# Patient Record
Sex: MALE | Race: WHITE | NOT HISPANIC OR LATINO | Employment: FULL TIME | URBAN - METROPOLITAN AREA
[De-identification: names, ages, dates, MRNs, and addresses within clinical notes are randomized per-mention and may not be internally consistent; named-entity substitution may affect disease eponyms.]

---

## 2017-08-28 ENCOUNTER — OFFICE VISIT (OUTPATIENT)
Dept: INTERNAL MEDICINE | Facility: MEDICAL CENTER | Age: 27
End: 2017-08-28
Payer: COMMERCIAL

## 2017-08-28 VITALS
BODY MASS INDEX: 25.87 KG/M2 | HEIGHT: 72 IN | OXYGEN SATURATION: 96 % | DIASTOLIC BLOOD PRESSURE: 75 MMHG | HEART RATE: 80 BPM | TEMPERATURE: 98.7 F | SYSTOLIC BLOOD PRESSURE: 124 MMHG | WEIGHT: 191 LBS

## 2017-08-28 DIAGNOSIS — Z72.51 HISTORY OF UNPROTECTED SEX: ICD-10-CM

## 2017-08-28 DIAGNOSIS — Z23 INFLUENZA VACCINATION ADMINISTERED AT CURRENT VISIT: ICD-10-CM

## 2017-08-28 DIAGNOSIS — Z23 TETANUS-DIPHTHERIA VACCINATION ADMINISTERED AT CURRENT VISIT: ICD-10-CM

## 2017-08-28 DIAGNOSIS — F32.A DEPRESSION, UNSPECIFIED DEPRESSION TYPE: ICD-10-CM

## 2017-08-28 DIAGNOSIS — D22.9 NUMEROUS MOLES: ICD-10-CM

## 2017-08-28 DIAGNOSIS — E04.2 MULTIPLE THYROID NODULES: ICD-10-CM

## 2017-08-28 DIAGNOSIS — E03.9 HYPOTHYROIDISM, UNSPECIFIED TYPE: ICD-10-CM

## 2017-08-28 DIAGNOSIS — Z00.00 PREVENTATIVE HEALTH CARE: ICD-10-CM

## 2017-08-28 PROCEDURE — 90715 TDAP VACCINE 7 YRS/> IM: CPT | Performed by: INTERNAL MEDICINE

## 2017-08-28 PROCEDURE — 90686 IIV4 VACC NO PRSV 0.5 ML IM: CPT | Performed by: INTERNAL MEDICINE

## 2017-08-28 PROCEDURE — 90472 IMMUNIZATION ADMIN EACH ADD: CPT | Performed by: INTERNAL MEDICINE

## 2017-08-28 PROCEDURE — 99204 OFFICE O/P NEW MOD 45 MIN: CPT | Mod: GC,25 | Performed by: INTERNAL MEDICINE

## 2017-08-28 PROCEDURE — 90471 IMMUNIZATION ADMIN: CPT | Performed by: INTERNAL MEDICINE

## 2017-08-28 RX ORDER — BUPROPION HYDROCHLORIDE 75 MG/1
75 TABLET ORAL 2 TIMES DAILY
Qty: 60 TAB | Refills: 2 | Status: SHIPPED | OUTPATIENT
Start: 2017-08-28 | End: 2017-12-20 | Stop reason: SDUPTHER

## 2017-08-28 RX ORDER — LEVOTHYROXINE SODIUM 175 UG/1
175 TABLET ORAL
Qty: 30 TAB | Refills: 2 | Status: SHIPPED | OUTPATIENT
Start: 2017-08-28 | End: 2017-12-20 | Stop reason: SDUPTHER

## 2017-08-28 ASSESSMENT — PATIENT HEALTH QUESTIONNAIRE - PHQ9
SUM OF ALL RESPONSES TO PHQ QUESTIONS 1-9: 23
5. POOR APPETITE OR OVEREATING: 3 - NEARLY EVERY DAY
CLINICAL INTERPRETATION OF PHQ2 SCORE: 5

## 2017-08-28 NOTE — PROGRESS NOTES
New Patient to Establish    Reason to establish: New patient to establish    CC: establish care    HPI: Mr. Taylor is a 25 y/o male with PMHx of hypothyroidism and depression who presents today to establish a PCP.   Per chart review, patient has had issues with insurance coverage and that has made him intermittently compliant with thyroid meds and imaging studies, and whenever he does not take them for a while he feels tired.  Thyroid US in 2006 showed multiple small b/l nodules (<2mm) but repeat US in 2009 showed no nodularity and was otherwise normal.      Today, patient reports that he has not been taking his thyroid meds x over a year.  He reports overall fatigue but no focal weakness.  He occasionally has palpitations but attributes that to anxiety.  He does suffer from depression and has been off wellbutrin for a while as well but felt it really helped when he was on it.  He is also open to therapy.      He does report that he works in a boarding pass factory and occasionally gets a sore throat from inhaling silicon in the air despite use of a mask.  He also works with metal a lot and has not had a recent tetanus shot.  He has not had his flu shot yet this year.  He also reports that he has unprotected sex and would like to be tested for STDs.  He does have tattoos, but states these were all done in clean places with fresh needles.  He has no h/o IVDU.   Patient also wishes to see a dermatologist to have some moles on his back evaluated.  He quit smoking a few months ago and exercises regularly.    Patient Active Problem List    Diagnosis Date Noted   • Preventative health care 08/28/2017   • History of unprotected sex 08/28/2017   • Numerous moles 08/28/2017   • Depression 09/09/2015   • Hypothyroid 06/01/2009   • Multiple thyroid nodules 06/01/2009       Past Medical History:   Diagnosis Date   • Depression 9/9/2015   • Hypothyroid 6/1/2009       Current Outpatient Prescriptions   Medication Sig  Dispense Refill   • levothyroxine (SYNTHROID) 175 MCG Tab Take 1 Tab by mouth Every morning on an empty stomach. 30 Tab 2   • buPROPion (WELLBUTRIN) 75 MG Tab Take 1 Tab by mouth 2 times a day. 60 Tab 2     No current facility-administered medications for this visit.        Allergies as of 08/28/2017 - Reviewed 08/28/2017   Allergen Reaction Noted   • Sulfa drugs  06/01/2009       Social History     Social History   • Marital status: Single     Spouse name: N/A   • Number of children: N/A   • Years of education: N/A     Occupational History   • Not on file.     Social History Main Topics   • Smoking status: Former Smoker     Packs/day: 0.50     Years: 7.00     Types: Cigarettes     Quit date: 4/11/2017   • Smokeless tobacco: Never Used   • Alcohol use 2.4 oz/week     4 Cans of beer per week   • Drug use:      Frequency: 5.0 times per week     Types: Marijuana   • Sexual activity: Yes     Partners: Female     Birth control/ protection: Other-See Comments      Comment: occasional condom use     Other Topics Concern   • Not on file     Social History Narrative   • No narrative on file       Family History   Problem Relation Age of Onset   • Adopted: Yes   • Psychiatry Mother        Past Surgical History:   Procedure Laterality Date   • HERNIA REPAIR      as an infant       ROS: As per HPI. Additional pertinent symptoms as noted below.  Constitutional: no fevers, chills, weight change; + fatigue  Eyes: no blurred vision, discharge, eye pain  ENT: no rhinorrhea, + occasional sore throat, no neck masses  Cardiovascular: no angina, palpitations, PND, orthopnea, edema  Respiratory: no cough, sputum, or dyspnea  GI: no nausea, vomiting, abdominal pain, constipation, or diarrhea  : no dysuria, hematuria, frequency   Musculo-skeletal: no joint or muscle pain  Skin: no rashes or open wounds  Neurological: no headaches, dizziness, motor/sensory loss  Psychological: + anxiety and depression; denies SI/HI  All others  negative    /75   Pulse 80   Temp 37.1 °C (98.7 °F)   Ht 1.829 m (6')   Wt 86.6 kg (191 lb)   SpO2 96%   BMI 25.90 kg/m²     Physical Exam  GEN: patient is in no acute distress   HEAD: normocephalic, atraumatic  EYES: PERRL, EOMI, no exophthalmos  THROAT: moist oral mucosa, no pharyngeal exudates or erythema; + enlarged tonsils with minimal erythema  NECK: supple, no lymphadenopathy, no thyroid enlargement   CVS: regular rate and rhythm, no murmur/rubs/gallops  LUNGS: CTABL, no rales/ronchi   ABD: soft, nontender, no guarding/rebound/rigidity, no organomegaly, BS(+)   NEURO: A&Ox4, no focal neurological deficits   EXT: no pedal edema, clubbing, or cyanosis   SKIN: skin intact, no suspicious rashes or lesions   MSK: no paravertebral tenderness, full ROM, no tremors   PSYCH: mood and affect normal       Assessment and Plan    Hypothyroidism, unspecified type  - pt intermittently compliant with synthroid; last TSH abnormal in 2015  - US in 2006 showed multiple b/l 2-3mm nodules which were not noted on repeat US in 2009  - will repeat TSH and FT4 today and resume synthroid at prior dose of 175 mcg/day  - repeat TSH/FT4 to be done prior to f/u in 2 months    Depression, unspecified depression type  - pt has been on wellbutrin in the past with good results  - denies SI/HI  - will check basic labs (CBC, CMP)  - resume wellbutrin 75 mg BID (start with QD x 1 week then increase dose  - referral placed to psychology    Unprotected Sex  - patient is requesting testing for STDs  - CG/chlam, HIV, RPR, and hep panel ordered    Numerous moles  - no overtly suspicious lesions, but pt referred to dermatology for further evaluation    Preventative health care  - annual flu shot given today (8/2017)  - pt has had chickenpox in childhood, not immunized with varicella  - last tetanus (Td) in 2004; booster (Tdap) given today (8/2017)  - up to date on all childhood vaccines (hep A/B, HiB, MMR, OPV, DTP)  - HIV and GC/chlam  ordered      Follow Up: Return in about 10 weeks (around 11/6/2017) for Long.    Signed by: Mariah Jones M.D.

## 2017-08-28 NOTE — PATIENT INSTRUCTIONS
- have lab work done now and a repeat TSH a few days prior to next visit  - start synthroid every morning on an empty stomach  - start wellbutrin once a day for a week and then go up to twice a day  - make appt with Dermatology and Psychology (call [phone] if you dont hear from the scheduling department within a week)  - return for f/u in 2 months

## 2017-11-06 ENCOUNTER — OFFICE VISIT (OUTPATIENT)
Dept: INTERNAL MEDICINE | Facility: MEDICAL CENTER | Age: 27
End: 2017-11-06
Payer: COMMERCIAL

## 2017-11-06 VITALS
OXYGEN SATURATION: 98 % | SYSTOLIC BLOOD PRESSURE: 122 MMHG | TEMPERATURE: 98.6 F | HEIGHT: 72 IN | RESPIRATION RATE: 14 BRPM | BODY MASS INDEX: 26.36 KG/M2 | DIASTOLIC BLOOD PRESSURE: 76 MMHG | WEIGHT: 194.6 LBS | HEART RATE: 70 BPM

## 2017-11-06 DIAGNOSIS — F32.A DEPRESSION, UNSPECIFIED DEPRESSION TYPE: ICD-10-CM

## 2017-11-06 DIAGNOSIS — E03.9 HYPOTHYROIDISM, UNSPECIFIED TYPE: ICD-10-CM

## 2017-11-06 PROCEDURE — 99213 OFFICE O/P EST LOW 20 MIN: CPT | Mod: GE | Performed by: INTERNAL MEDICINE

## 2017-11-06 ASSESSMENT — PATIENT HEALTH QUESTIONNAIRE - PHQ9
5. POOR APPETITE OR OVEREATING: 0 - NOT AT ALL
CLINICAL INTERPRETATION OF PHQ2 SCORE: 2
SUM OF ALL RESPONSES TO PHQ QUESTIONS 1-9: 11

## 2017-11-06 ASSESSMENT — PAIN SCALES - GENERAL: PAINLEVEL: NO PAIN

## 2017-11-06 NOTE — PROGRESS NOTES
Established Patient    Bryn presents today with the following:    CC: depression, hypothyroidism    HPI:   Mr. Taylor is a 25 y/o male with PMHx of hypothyroidism and depression who presents today for f/u.       He reports feeling better after starting synthroid, but does not feel wellbutrin has had much effect.  He attributes this to a bad workplace environment however, and is in the process of switching jobs.  He had his labs done today, so will review results with him once those are resulted.  He denies SI/HI.  We had an extensive discussion of the risks/benefits of medical marijuana to help treat his depression, and patient agrees not to pursue this as a treatment at this time.  He has not yet been able to establish with a therapist or a dermatologist as referred at last visit due to time constraints.      Patient Active Problem List    Diagnosis Date Noted   • Preventative health care 08/28/2017   • History of unprotected sex 08/28/2017   • Numerous moles 08/28/2017   • Depression 09/09/2015   • Hypothyroid 06/01/2009       Current Outpatient Prescriptions   Medication Sig Dispense Refill   • levothyroxine (SYNTHROID) 175 MCG Tab Take 1 Tab by mouth Every morning on an empty stomach. 30 Tab 2   • buPROPion (WELLBUTRIN) 75 MG Tab Take 1 Tab by mouth 2 times a day. 60 Tab 2     No current facility-administered medications for this visit.        ROS: As per HPI. Additional pertinent symptoms as noted below.  Constitutional: no fevers, chills, weight change  Eyes: no blurred vision, discharge, eye pain  ENT: no rhinorrhea, sore throat, neck masses  Cardiovascular: no angina, palpitations, PND, orthopnea, edema  Respiratory: no cough, sputum, or dyspnea  GI: no nausea, vomiting, abdominal pain, constipation, or diarrhea  : no dysuria, hematuria, frequency   Musculo-skeletal: no joint or muscle pain  Skin: no rashes or open wounds  Neurological: no headaches, dizziness, motor/sensory loss   Psychological: no  anxiety; + depression; denies SI/HI; + insomnia  All others negative    /76   Pulse 70   Temp 37 °C (98.6 °F)   Resp 14   Ht 1.829 m (6')   Wt 88.3 kg (194 lb 9.6 oz)   SpO2 98%   BMI 26.39 kg/m²     Physical Exam   GEN: patient is in no acute distress   HEAD: normocephalic, atraumatic  EYES: PERRL, EOMI  THROAT: moist oral mucosa, no pharyngeal exudates or erythema  NECK: supple, no lymphadenopathy, no thyroid enlargement   CVS: regular rate and rhythm, no murmur/rubs/gallops  LUNGS: CTABL, no rales/ronchi   ABD: soft, nontender, no guarding/rebound/rigidity, no organomegaly, BS(+)   NEURO: A&Ox4, no focal neurological deficits   EXT: no pedal edema, clubbing, or cyanosis   SKIN: skin intact, no suspicious rashes or lesions   MSK: no paravertebral tenderness, full ROM   PSYCH: mood and affect normal       Assessment and Plan    Hypothyroidism, unspecified type  - last TSH abnormal in 2015  - US in 2006 showed multiple b/l 2-3mm nodules which were not noted on repeat US in 2009  - continue synthroid at prior dose of 175 mcg/day  - repeat TSH/FT4 pending    Depression, unspecified depression type  - denies SI/HI  - results of labs (CBC/CMP) pending  - continue wellbutrin 75 mg BID   - patient pursuing lifestyle modifications to help with insomnia/depression  - avoid use of THC as able  - psychologist info given to patient to set up appt    Unprotected Sex  - CG/chlam, HIV, RPR, and hep panel results pending from last visit    Numerous moles  - no overtly suspicious lesions  - pt given info to schedule derm appt    Preventative health care  - flu shot given 8/2017  - pt has had chickenpox in childhood, not immunized with varicella  - last tetanus (Td) in 2004; booster (Tdap) given today (8/2017)  - up to date on all childhood vaccines (hep A/B, HiB, MMR, OPV, DTP)  - HIV and GC/chlam results pending      Follow Up: Return in about 3 months (around 2/6/2018).    Signed by: Mariah Jones M.D.

## 2017-11-06 NOTE — PATIENT INSTRUCTIONS
- make appt with derm  [facility]  [address]  [place], [place]  [id number] [phone]  - make psych appt  [facility]   [address], [place] [id number] [phone]  - f/u in 3 months

## 2017-11-08 LAB
ALBUMIN SERPL-MCNC: 4.6 G/DL (ref 3.5–5.5)
ALBUMIN/GLOB SERPL: 1.5 {RATIO} (ref 1.2–2.2)
ALP SERPL-CCNC: 68 IU/L (ref 39–117)
ALT SERPL-CCNC: 28 IU/L (ref 0–44)
AST SERPL-CCNC: 23 IU/L (ref 0–40)
BASOPHILS # BLD AUTO: 0 X10E3/UL (ref 0–0.2)
BASOPHILS NFR BLD AUTO: 1 %
BILIRUB SERPL-MCNC: 0.4 MG/DL (ref 0–1.2)
BUN SERPL-MCNC: 10 MG/DL (ref 6–20)
BUN/CREAT SERPL: 10 (ref 9–20)
C TRACH RRNA SPEC QL NAA+PROBE: NEGATIVE
CALCIUM SERPL-MCNC: 9.7 MG/DL (ref 8.7–10.2)
CHLORIDE SERPL-SCNC: 102 MMOL/L (ref 96–106)
CO2 SERPL-SCNC: 24 MMOL/L (ref 18–29)
CREAT SERPL-MCNC: 0.99 MG/DL (ref 0.76–1.27)
EOSINOPHIL # BLD AUTO: 0.1 X10E3/UL (ref 0–0.4)
EOSINOPHIL NFR BLD AUTO: 3 %
ERYTHROCYTE [DISTWIDTH] IN BLOOD BY AUTOMATED COUNT: 12.9 % (ref 12.3–15.4)
GFR SERPLBLD CREATININE-BSD FMLA CKD-EPI: 104 ML/MIN/1.73
GFR SERPLBLD CREATININE-BSD FMLA CKD-EPI: 120 ML/MIN/1.73
GLOBULIN SER CALC-MCNC: 3.1 G/DL (ref 1.5–4.5)
GLUCOSE SERPL-MCNC: 93 MG/DL (ref 65–99)
HAV IGM SERPL QL IA: NEGATIVE
HBV CORE IGM SERPL QL IA: NEGATIVE
HBV SURFACE AG SERPL QL IA: NEGATIVE
HCT VFR BLD AUTO: 46.2 % (ref 37.5–51)
HCV AB S/CO SERPL IA: 0.1 S/CO RATIO (ref 0–0.9)
HGB BLD-MCNC: 16.5 G/DL (ref 12.6–17.7)
HIV 1+2 AB+HIV1 P24 AG SERPL QL IA: NON REACTIVE
IMM GRANULOCYTES # BLD: 0 X10E3/UL (ref 0–0.1)
IMM GRANULOCYTES NFR BLD: 0 %
IMMATURE CELLS  115398: NORMAL
LYMPHOCYTES # BLD AUTO: 1.7 X10E3/UL (ref 0.7–3.1)
LYMPHOCYTES NFR BLD AUTO: 32 %
MCH RBC QN AUTO: 30.9 PG (ref 26.6–33)
MCHC RBC AUTO-ENTMCNC: 35.7 G/DL (ref 31.5–35.7)
MCV RBC AUTO: 87 FL (ref 79–97)
MONOCYTES # BLD AUTO: 0.4 X10E3/UL (ref 0.1–0.9)
MONOCYTES NFR BLD AUTO: 7 %
MORPHOLOGY BLD-IMP: NORMAL
N GONORRHOEA RRNA SPEC QL NAA+PROBE: NEGATIVE
NEUTROPHILS # BLD AUTO: 2.9 X10E3/UL (ref 1.4–7)
NEUTROPHILS NFR BLD AUTO: 57 %
NRBC BLD AUTO-RTO: NORMAL %
PLATELET # BLD AUTO: 336 X10E3/UL (ref 150–379)
POTASSIUM SERPL-SCNC: 4.2 MMOL/L (ref 3.5–5.2)
PROT SERPL-MCNC: 7.7 G/DL (ref 6–8.5)
RBC # BLD AUTO: 5.34 X10E6/UL (ref 4.14–5.8)
RPR SER QL: NON REACTIVE
SODIUM SERPL-SCNC: 142 MMOL/L (ref 134–144)
T4 FREE SERPL-MCNC: 1.17 NG/DL (ref 0.82–1.77)
TSH SERPL DL<=0.005 MIU/L-ACNC: 9.09 UIU/ML (ref 0.45–4.5)
WBC # BLD AUTO: 5.1 X10E3/UL (ref 3.4–10.8)

## 2018-02-06 ENCOUNTER — OFFICE VISIT (OUTPATIENT)
Dept: URGENT CARE | Facility: CLINIC | Age: 28
End: 2018-02-06
Payer: COMMERCIAL

## 2018-02-06 VITALS
HEIGHT: 72 IN | OXYGEN SATURATION: 97 % | DIASTOLIC BLOOD PRESSURE: 76 MMHG | TEMPERATURE: 98.5 F | HEART RATE: 74 BPM | RESPIRATION RATE: 14 BRPM | BODY MASS INDEX: 25.73 KG/M2 | SYSTOLIC BLOOD PRESSURE: 124 MMHG | WEIGHT: 190 LBS

## 2018-02-06 DIAGNOSIS — J02.9 SORE THROAT: ICD-10-CM

## 2018-02-06 DIAGNOSIS — J02.9 EXUDATIVE PHARYNGITIS: Primary | ICD-10-CM

## 2018-02-06 LAB
INT CON NEG: NEGATIVE
INT CON POS: POSITIVE
S PYO AG THROAT QL: NORMAL

## 2018-02-06 PROCEDURE — 99203 OFFICE O/P NEW LOW 30 MIN: CPT | Performed by: PHYSICIAN ASSISTANT

## 2018-02-06 PROCEDURE — 87880 STREP A ASSAY W/OPTIC: CPT | Performed by: PHYSICIAN ASSISTANT

## 2018-02-06 RX ORDER — AMOXICILLIN AND CLAVULANATE POTASSIUM 875; 125 MG/1; MG/1
1 TABLET, FILM COATED ORAL 2 TIMES DAILY
Qty: 20 TAB | Refills: 0 | Status: SHIPPED | OUTPATIENT
Start: 2018-02-06 | End: 2018-02-16

## 2018-02-06 NOTE — LETTER
February 6, 2018       Patient: Bryn Taylor   YOB: 1990   Date of Visit: 2/6/2018         To Whom It May Concern:    It is my medical opinion that Bryn Taylor may be excused from work for the dates of 2/6/18-2/8/18.      If you have any questions or concerns, please don't hesitate to call [phone]          Sincerely,          Obi Shah P.A.-C.  Electronically Signed

## 2018-02-06 NOTE — PATIENT INSTRUCTIONS
"Strep Throat  Strep throat is an infection of the throat caused by a bacteria named Streptococcus pyogenes. Your health care provider may call the infection streptococcal \"tonsillitis\" or \"pharyngitis\" depending on whether there are signs of inflammation in the tonsils or back of the throat. Strep throat is most common in children aged 5-15 years during the cold months of the year, but it can occur in people of any age during any season. This infection is spread from person to person (contagious) through coughing, sneezing, or other close contact.  SIGNS AND SYMPTOMS   · Fever or chills.  · Painful, swollen, red tonsils or throat.  · Pain or difficulty when swallowing.  · White or yellow spots on the tonsils or throat.  · Swollen, tender lymph nodes or \"glands\" of the neck or under the jaw.  · Red rash all over the body (rare).  DIAGNOSIS   Many different infections can cause the same symptoms. A test must be done to confirm the diagnosis so the right treatment can be given. A \"rapid strep test\" can help your health care provider make the diagnosis in a few minutes. If this test is not available, a light swab of the infected area can be used for a throat culture test. If a throat culture test is done, results are usually available in a day or two.  TREATMENT   Strep throat is treated with antibiotic medicine.  HOME CARE INSTRUCTIONS   · Gargle with 1 tsp of salt in 1 cup of warm water, 3-4 times per day or as needed for comfort.  · Family members who also have a sore throat or fever should be tested for strep throat and treated with antibiotics if they have the strep infection.  · Make sure everyone in your household washes their hands well.  · Do not share food, drinking cups, or personal items that could cause the infection to spread to others.  · You may need to eat a soft food diet until your sore throat gets better.  · Drink enough water and fluids to keep your urine clear or pale yellow. This will help prevent " dehydration.  · Get plenty of rest.  · Stay home from school, day care, or work until you have been on antibiotics for 24 hours.  · Take medicines only as directed by your health care provider.  · Take your antibiotic medicine as directed by your health care provider. Finish it even if you start to feel better.  SEEK MEDICAL CARE IF:   · The glands in your neck continue to enlarge.  · You develop a rash, cough, or earache.  · You cough up green, yellow-brown, or bloody sputum.  · You have pain or discomfort not controlled by medicines.  · Your problems seem to be getting worse rather than better.  · You have a fever.  SEEK IMMEDIATE MEDICAL CARE IF:   · You develop any new symptoms such as vomiting, severe headache, stiff or painful neck, chest pain, shortness of breath, or trouble swallowing.  · You develop severe throat pain, drooling, or changes in your voice.  · You develop swelling of the neck, or the skin on the neck becomes red and tender.  · You develop signs of dehydration, such as fatigue, dry mouth, and decreased urination.  · You become increasingly sleepy, or you cannot wake up completely.  MAKE SURE YOU:  · Understand these instructions.  · Will watch your condition.  · Will get help right away if you are not doing well or get worse.     This information is not intended to replace advice given to you by your health care provider. Make sure you discuss any questions you have with your health care provider.     Document Released: 12/15/2001 Document Revised: 01/08/2016 Document Reviewed: 04/11/2016  [employer] Interactive Patient Education ©2016 [employer]

## 2018-02-07 NOTE — PROGRESS NOTES
Subjective:      Pt is a 27 y.o. male who presents with URI (uri , sore throat , some diarrhea x 2 weeks . needs work note .)            HPI  PT presents to  clinic today complaining of sore throat,  pressure in ears, cough, fatigue, runny nose. PT denies CP, SOB, NV, abdominal pain, joint pain. PT states these symptoms began around 2 weeks ago. PT states the pain is a 6/10 with swallowing, aching in nature and worse at night.  Pt has not taken any RX medications for this condition. The pt's medication list, problem list, and allergies have been evaluated and reviewed during today's visit.      PMH:  Past Medical History:   Diagnosis Date   • Depression 9/9/2015   • Hypothyroid 6/1/2009       PSH:  Past Surgical History:   Procedure Laterality Date   • HERNIA REPAIR      as an infant       Fam Hx:    family history includes Psychiatry in his mother. He was adopted.  Family Status   Relation Status   • Mother        Soc HX:  Social History     Social History   • Marital status: Single     Spouse name: N/A   • Number of children: N/A   • Years of education: N/A     Occupational History   • Not on file.     Social History Main Topics   • Smoking status: Former Smoker     Packs/day: 0.50     Years: 7.00     Types: Cigarettes     Quit date: 4/11/2017   • Smokeless tobacco: Former User     Types: Chew   • Alcohol use 2.4 oz/week     4 Cans of beer per week   • Drug use: Yes     Frequency: 5.0 times per week     Types: Marijuana   • Sexual activity: Yes     Partners: Female     Birth control/ protection: Other-See Comments      Comment: occasional condom use     Other Topics Concern   • Not on file     Social History Narrative   • No narrative on file         Medications:    Current Outpatient Prescriptions:   •  Chlorphen-Pseudoephed-APAP (THERAFLU FLU/COLD PO), Take  by mouth., Disp: , Rfl:   •  amoxicillin-clavulanate (AUGMENTIN) 875-125 MG Tab, Take 1 Tab by mouth 2 times a day for 10 days., Disp: 20 Tab, Rfl: 0  •   Alum & Mag Hydroxide-Simeth (MAALOX PLUS-BENADRYL-XYLOCAINE), Take 15 mL by mouth every 6 hours as needed for up to 5 days., Disp: 300 mL, Rfl: 0  •  levothyroxine (SYNTHROID) 175 MCG Tab, TAKE 1 TABLET BY MOUTH EVERY MORNING ON EMPTY STOMACH, Disp: 90 Tab, Rfl: 3  •  buPROPion (WELLBUTRIN) 75 MG Tab, TAKE 1 TABLET BY MOUTH TWICE A DAY, Disp: 180 Tab, Rfl: 3      Allergies:  Sulfa drugs    ROS  Constitutional: Positive for malaise/fatigue.   HENT: Positive for congestion and sore throat. Negative for ear pain.    Eyes: Negative for blurred vision, double vision and photophobia.   Respiratory: Positive for cough and sputum production. Negative for hemoptysis, shortness of breath and wheezing.    Cardiovascular: Negative for chest pain and palpitations.   Gastrointestinal: Negative for nausea, vomiting, abdominal pain, and constipation. +loose stools  Genitourinary: Negative for dysuria and flank pain.   Musculoskeletal: Negative for falls and myalgias.   Skin: Negative for itching and rash.   Neurological:  Negative for dizziness and tingling.   Endo/Heme/Allergies: Does not bruise/bleed easily.   Psychiatric/Behavioral: Negative for depression. The patient is not nervous/anxious.             Objective:     /76   Pulse 74   Temp 36.9 °C (98.5 °F)   Resp 14   Ht 1.829 m (6')   Wt 86.2 kg (190 lb)   SpO2 97%   BMI 25.77 kg/m²      Physical Exam       Constitutional: PT is oriented to person, place, and time. PT appears well-developed and well-nourished. No distress.   HENT:   Head: Normocephalic and atraumatic.   Right Ear: Hearing, tympanic membrane, external ear and ear canal normal.   Left Ear: Hearing, tympanic membrane, external ear and ear canal normal.   Nose: Mucosal edema, rhinorrhea and sinus tenderness present. Right sinus exhibits frontal sinus tenderness. Left sinus exhibits frontal sinus tenderness.   Mouth/Throat: Uvula is midline. Mucous membranes are pale. Posterior oropharyngeal edema and  posterior oropharyngeal erythema with exudate noted on exam.   Eyes: Conjunctivae normal and EOM are normal. Pupils are equal, round, and reactive to light.   Neck: Normal range of motion. Neck supple. No thyromegaly present.   Cardiovascular: Normal rate, regular rhythm, normal heart sounds and intact distal pulses.  Exam reveals no gallop and no friction rub.    No murmur heard.  Pulmonary/Chest: Effort normal and breath sounds normal. No respiratory distress. PT has no wheezes. PT has no rales. PT exhibits no tenderness.   Abdominal: Soft. Bowel sounds are normal. PT exhibits no distension and no mass. There is no tenderness. There is no rebound and no guarding.   Musculoskeletal: Normal range of motion. PT exhibits no edema and no tenderness.   Lymphadenopathy:     PT has no cervical adenopathy.   Neurological: PT is alert and oriented to person, place, and time. PT displays normal reflexes. No cranial nerve deficit. PT exhibits normal muscle tone. Coordination normal.   Skin: Skin is warm and dry. No rash noted. No erythema.   Psychiatric: PT has a normal mood and affect. PT behavior is normal. Judgment and thought content normal.        Assessment/Plan:     1. Exudative pharyngitis  Back-up abx if symptoms do not improve in 2-3 days. PT on clinical presentation has exudate on oropharynx and it's possible beyond the strep A testing that this could be a different type of strep (C/G) or A. haemolyticum     - POCT Rapid Strep A-->NEG  - amoxicillin-clavulanate (AUGMENTIN) 875-125 MG Tab; Take 1 Tab by mouth 2 times a day for 10 days.  Dispense: 20 Tab; Refill: 0  - Alum & Mag Hydroxide-Simeth (MAALOX PLUS-BENADRYL-XYLOCAINE); Take 15 mL by mouth every 6 hours as needed for up to 5 days.  Dispense: 300 mL; Refill: 0    2. Sore throat    Rest, fluids encouraged.  OTC decongestant for congestion  Note given for work.  AVS with medical info given.  Pt was in full understanding and agreement with the plan.  Follow-up  as needed if symptoms worsen or fail to improve.

## 2018-03-02 ENCOUNTER — OFFICE VISIT (OUTPATIENT)
Dept: URGENT CARE | Facility: CLINIC | Age: 28
End: 2018-03-02
Payer: COMMERCIAL

## 2018-03-02 VITALS
RESPIRATION RATE: 14 BRPM | WEIGHT: 189 LBS | BODY MASS INDEX: 25.6 KG/M2 | HEIGHT: 72 IN | TEMPERATURE: 98.5 F | SYSTOLIC BLOOD PRESSURE: 120 MMHG | OXYGEN SATURATION: 98 % | HEART RATE: 76 BPM | DIASTOLIC BLOOD PRESSURE: 80 MMHG

## 2018-03-02 DIAGNOSIS — J02.9 EXUDATIVE PHARYNGITIS: Primary | ICD-10-CM

## 2018-03-02 LAB
INT CON NEG: NEGATIVE
INT CON POS: POSITIVE
S PYO AG THROAT QL: NORMAL

## 2018-03-02 PROCEDURE — 99214 OFFICE O/P EST MOD 30 MIN: CPT | Performed by: PHYSICIAN ASSISTANT

## 2018-03-02 PROCEDURE — 87880 STREP A ASSAY W/OPTIC: CPT | Performed by: PHYSICIAN ASSISTANT

## 2018-03-02 RX ORDER — CLINDAMYCIN HYDROCHLORIDE 300 MG/1
300 CAPSULE ORAL 3 TIMES DAILY
Qty: 30 CAP | Refills: 0 | Status: SHIPPED | OUTPATIENT
Start: 2018-03-02 | End: 2019-05-30

## 2018-03-02 NOTE — LETTER
March 2, 2018         Patient: Bryn Taylor   YOB: 1990   Date of Visit: 3/2/2018           To Whom it May Concern:    Bryn Taylor was seen in my clinic on 3/2/2018. He may be excused from work for the dates of 3/5/18-3/6/18.        If you have any questions or concerns, please don't hesitate to call.        Sincerely,           Obi Shah P.A.-C.  Electronically Signed

## 2018-03-03 NOTE — PATIENT INSTRUCTIONS
Strep Throat  Strep throat is an infection of the throat. It is caused by germs. Strep throat spreads from person to person because of coughing, sneezing, or close contact.  Follow these instructions at home:  Medicines  · Take over-the-counter and prescription medicines only as told by your doctor.  · Take your antibiotic medicine as told by your doctor. Do not stop taking the medicine even if you feel better.  · Have family members who also have a sore throat or fever go to a doctor.  Eating and drinking  · Do not share food, drinking cups, or personal items.  · Try eating soft foods until your sore throat feels better.  · Drink enough fluid to keep your pee (urine) clear or pale yellow.  General instructions  · Rinse your mouth (gargle) with a salt-water mixture 3-4 times per day or as needed. To make a salt-water mixture, stir ½-1 tsp of salt into 1 cup of warm water.  · Make sure that all people in your house wash their hands well.  · Rest.  · Stay home from school or work until you have been taking antibiotics for 24 hours.  · Keep all follow-up visits as told by your doctor. This is important.  Contact a doctor if:  · Your neck keeps getting bigger.  · You get a rash, cough, or earache.  · You cough up thick liquid that is green, yellow-brown, or bloody.  · You have pain that does not get better with medicine.  · Your problems get worse instead of getting better.  · You have a fever.  Get help right away if:  · You throw up (vomit).  · You get a very bad headache.  · You neck hurts or it feels stiff.  · You have chest pain or you are short of breath.  · You have drooling, very bad throat pain, or changes in your voice.  · Your neck is swollen or the skin gets red and tender.  · Your mouth is dry or you are peeing less than normal.  · You keep feeling more tired or it is hard to wake up.  · Your joints are red or they hurt.  This information is not intended to replace advice given to you by your health care  provider. Make sure you discuss any questions you have with your health care provider.  Document Released: 06/05/2009 Document Revised: 08/16/2017 Document Reviewed: 04/11/2016  [employer] Interactive Patient Education © 2017 [employer]

## 2018-03-06 NOTE — PROGRESS NOTES
Subjective:      Pt is a 27 y.o. male who presents with Pharyngitis (sore throat , nausea x 4 days . needs work note .)            HPI  PT presents to  clinic today complaining of sore throat, mild nausea,  fatigue, runny nose. PT denies CP, SOB, NVD, abdominal pain, joint pain. PT states these symptoms began around 4 days ago. PT states the pain is a 7/10 with swallowing, aching in nature and worse at night.  Pt has not taken any RX medications for this condition. The pt's medication list, problem list, and allergies have been evaluated and reviewed during today's visit.      PMH:  Past Medical History:   Diagnosis Date   • Depression 9/9/2015   • Hypothyroid 6/1/2009       PSH:  Past Surgical History:   Procedure Laterality Date   • HERNIA REPAIR      as an infant       Fam Hx:    family history includes Psychiatry in his mother. He was adopted.  Family Status   Relation Status   • Mother        Soc HX:  Social History     Social History   • Marital status: Single     Spouse name: N/A   • Number of children: N/A   • Years of education: N/A     Occupational History   • Not on file.     Social History Main Topics   • Smoking status: Former Smoker     Packs/day: 0.50     Years: 7.00     Types: Cigarettes     Quit date: 4/11/2017   • Smokeless tobacco: Former User     Types: Chew   • Alcohol use 2.4 oz/week     4 Cans of beer per week   • Drug use: Yes     Frequency: 5.0 times per week     Types: Marijuana   • Sexual activity: Yes     Partners: Female     Birth control/ protection: Other-See Comments      Comment: occasional condom use     Other Topics Concern   • Not on file     Social History Narrative   • No narrative on file         Medications:    Current Outpatient Prescriptions:   •  clindamycin (CLEOCIN) 300 MG Cap, Take 1 Cap by mouth 3 times a day., Disp: 30 Cap, Rfl: 0  •  Chlorphen-Pseudoephed-APAP (THERAFLU FLU/COLD PO), Take  by mouth., Disp: , Rfl:   •  levothyroxine (SYNTHROID) 175 MCG Tab, TAKE 1  TABLET BY MOUTH EVERY MORNING ON EMPTY STOMACH, Disp: 90 Tab, Rfl: 3  •  buPROPion (WELLBUTRIN) 75 MG Tab, TAKE 1 TABLET BY MOUTH TWICE A DAY, Disp: 180 Tab, Rfl: 3      Allergies:  Other food and Sulfa drugs    ROS    Constitutional: Positive for malaise/fatigue.   HENT: Positive for congestion and sore throat. Negative for ear pain.    Eyes: Negative for blurred vision, double vision and photophobia.   Respiratory:  Negative for hemoptysis, shortness of breath and wheezing.    Cardiovascular: Negative for chest pain and palpitations.   Gastrointestinal: Negative for nausea, vomiting, abdominal pain, diarrhea and constipation.   Genitourinary: Negative for dysuria and flank pain.   Musculoskeletal: Negative for falls and myalgias.   Skin: Negative for itching and rash.   Neurological:  Negative for dizziness and tingling.   Endo/Heme/Allergies: Does not bruise/bleed easily.   Psychiatric/Behavioral: Negative for depression. The patient is not nervous/anxious.           Objective:     /80   Pulse 76   Temp 36.9 °C (98.5 °F)   Resp 14   Ht 1.829 m (6')   Wt 85.7 kg (189 lb)   SpO2 98%   BMI 25.63 kg/m²      Physical Exam          Constitutional: PT is oriented to person, place, and time. PT appears well-developed and well-nourished. No distress.   HENT:   Head: Normocephalic and atraumatic.   Right Ear: Hearing, tympanic membrane, external ear and ear canal normal.   Left Ear: Hearing, tympanic membrane, external ear and ear canal normal.   Nose: Mucosal edema, rhinorrhea and sinus tenderness present. Right sinus exhibits frontal sinus tenderness. Left sinus exhibits frontal sinus tenderness.   Mouth/Throat: Uvula is midline. Mucous membranes are pale. Posterior oropharyngeal edema and posterior oropharyngeal erythema with exudate noted on exam.   Eyes: Conjunctivae normal and EOM are normal. Pupils are equal, round, and reactive to light.   Neck: Normal range of motion. Neck supple. No thyromegaly  present.   Cardiovascular: Normal rate, regular rhythm, normal heart sounds and intact distal pulses.  Exam reveals no gallop and no friction rub.    No murmur heard.  Pulmonary/Chest: Effort normal and breath sounds normal. No respiratory distress. PT has no wheezes. PT has no rales. PT exhibits no tenderness.   Abdominal: Soft. Bowel sounds are normal. PT exhibits no distension and no mass. There is no tenderness. There is no rebound and no guarding.   Musculoskeletal: Normal range of motion. PT exhibits no edema and no tenderness.   Lymphadenopathy:     PT has no cervical adenopathy.   Neurological: PT is alert and oriented to person, place, and time. PT displays normal reflexes. No cranial nerve deficit. PT exhibits normal muscle tone. Coordination normal.   Skin: Skin is warm and dry. No rash noted. No erythema.   Psychiatric: PT has a normal mood and affect. PT behavior is normal. Judgment and thought content normal.     Assessment/Plan:     1. Exudative pharyngitis  Back-up abx if symptoms do not improve in 2-3 days. PT on clinical presentation has exudate on oropharynx and it's possible beyond the strep A testing that this could be a different type of strep (C/G) or A. haemolyticum     - POCT Rapid Strep A-->NEG  - clindamycin (CLEOCIN) 300 MG Cap; Take 1 Cap by mouth 3 times a day.  Dispense: 30 Cap; Refill: 0    Rest, fluids encouraged.  OTC decongestant for congestion  Note given for work.  AVS with medical info given.  Pt was in full understanding and agreement with the plan.  Follow-up as needed if symptoms worsen or fail to improve.

## 2018-10-23 ENCOUNTER — APPOINTMENT (OUTPATIENT)
Dept: INTERNAL MEDICINE | Facility: MEDICAL CENTER | Age: 28
End: 2018-10-23

## 2019-05-30 ENCOUNTER — OFFICE VISIT (OUTPATIENT)
Dept: URGENT CARE | Facility: CLINIC | Age: 29
End: 2019-05-30

## 2019-05-30 ENCOUNTER — APPOINTMENT (OUTPATIENT)
Dept: RADIOLOGY | Facility: IMAGING CENTER | Age: 29
End: 2019-05-30
Attending: NURSE PRACTITIONER

## 2019-05-30 VITALS
RESPIRATION RATE: 14 BRPM | WEIGHT: 189 LBS | BODY MASS INDEX: 25.6 KG/M2 | TEMPERATURE: 98.9 F | DIASTOLIC BLOOD PRESSURE: 68 MMHG | OXYGEN SATURATION: 98 % | SYSTOLIC BLOOD PRESSURE: 112 MMHG | HEART RATE: 73 BPM | HEIGHT: 72 IN

## 2019-05-30 DIAGNOSIS — S49.92XA INJURY OF LEFT SHOULDER, INITIAL ENCOUNTER: ICD-10-CM

## 2019-05-30 DIAGNOSIS — S43.102A SEPARATION OF LEFT ACROMIOCLAVICULAR JOINT, INITIAL ENCOUNTER: ICD-10-CM

## 2019-05-30 PROCEDURE — 99213 OFFICE O/P EST LOW 20 MIN: CPT | Performed by: NURSE PRACTITIONER

## 2019-05-30 PROCEDURE — 73030 X-RAY EXAM OF SHOULDER: CPT | Mod: TC,LT | Performed by: NURSE PRACTITIONER

## 2019-05-30 RX ORDER — ESCITALOPRAM OXALATE 20 MG/1
20 TABLET ORAL
Refills: 1 | COMMUNITY
Start: 2019-05-16

## 2019-05-30 RX ORDER — BLOOD-GLUCOSE METER
EACH MISCELLANEOUS
Refills: 0 | COMMUNITY
Start: 2019-04-19

## 2019-05-30 ASSESSMENT — ENCOUNTER SYMPTOMS
CONSTITUTIONAL NEGATIVE: 1
GASTROINTESTINAL NEGATIVE: 1
BACK PAIN: 0
CARDIOVASCULAR NEGATIVE: 1
PSYCHIATRIC NEGATIVE: 1
RESPIRATORY NEGATIVE: 1
FALLS: 1
NECK PAIN: 0
NEUROLOGICAL NEGATIVE: 1

## 2019-05-30 NOTE — LETTER
May 30, 2019         Patient: Bryn Taylor   YOB: 1990   Date of Visit: 5/30/2019           To Whom it May Concern:    Bryn Taylor was seen in my clinic on 5/30/2019. He may be excused from work tomorrow due to injury.     If you have any questions or concerns, please don't hesitate to call.        Sincerely,           JOSÉ MIGUEL Shanks.  Electronically Signed
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
## 2019-08-16 ENCOUNTER — OCCUPATIONAL MEDICINE (OUTPATIENT)
Dept: URGENT CARE | Facility: PHYSICIAN GROUP | Age: 29
End: 2019-08-16
Payer: OTHER MISCELLANEOUS

## 2019-08-16 VITALS
SYSTOLIC BLOOD PRESSURE: 130 MMHG | RESPIRATION RATE: 16 BRPM | TEMPERATURE: 97.7 F | WEIGHT: 176 LBS | DIASTOLIC BLOOD PRESSURE: 76 MMHG | OXYGEN SATURATION: 98 % | HEART RATE: 55 BPM | BODY MASS INDEX: 23.87 KG/M2

## 2019-08-16 DIAGNOSIS — S01.81XA FACIAL LACERATION, INITIAL ENCOUNTER: ICD-10-CM

## 2019-08-16 PROCEDURE — 90471 IMMUNIZATION ADMIN: CPT | Mod: 29 | Performed by: PHYSICIAN ASSISTANT

## 2019-08-16 PROCEDURE — 12011 RPR F/E/E/N/L/M 2.5 CM/<: CPT | Mod: 29 | Performed by: PHYSICIAN ASSISTANT

## 2019-08-16 PROCEDURE — 90715 TDAP VACCINE 7 YRS/> IM: CPT | Mod: 29 | Performed by: PHYSICIAN ASSISTANT

## 2019-08-16 NOTE — LETTER
"   [place] [address] [facility] [place] [address]  Phone:  [phone] - Fax:  [phone]   [facility] Progress Report and Disability Certification  Date of Service: 8/16/2019   No Show:  No  Date / Time of Next Visit: 8/23/2019   Claim Information   Patient Name: Bryn Taylor  Claim Number:     Employer:   [employer] Date of Injury: 8/16/2019     Insurer / TPA: [employer]  ID / SSN:     Occupation: Install  Diagnosis: The encounter diagnosis was Facial laceration, initial encounter.    Medical Information   Related to Industrial Injury? Yes    Subjective Complaints:  DOI: 8/16/19 around 11:00 hrs  Patient was tightening down a bolt when the impact  \"popped me in the chin.\" No chipped/loose teeth. Denies tongue injury. Unknown last tdap.   Objective Findings: Skin: 1.5 cm laceration along the mandible on the left side.   Pre-Existing Condition(s):     Assessment:   Initial Visit    Status: Additional Care Required  Permanent Disability:No    Plan:      Diagnostics:      Comments:       Disability Information   Status: Released to Full Duty    From:  8/16/2019  Through: 8/23/2019 Restrictions are:     Physical Restrictions   Sitting:    Standing:    Stooping:    Bending:      Squatting:    Walking:    Climbing:    Pushing:      Pulling:    Other:    Reaching Above Shoulder (L):   Reaching Above Shoulder (R):       Reaching Below Shoulder (L):    Reaching Below Shoulder (R):      Not to exceed Weight Limits   Carrying(hrs):   Weight Limit(lb):   Lifting(hrs):   Weight  Limit(lb):     Comments:      Repetitive Actions   Hands: i.e. Fine Manipulations from Grasping:     Feet: i.e. Operating Foot Controls:     Driving / Operate Machinery:     Physician Name: Jay Fleming P.A.-C. Physician Signature: JAY Hess P.A.-C. e-Signature: Dr. Andrew Bach, Medical Director   Clinic Name / Location: [place] [address] [place] [address] Clinic Phone Number: Dept: [phone]   Appointment Time: 11:55 Am Visit Start Time: 12:18 PM   Check-In Time:  12:10 Pm Visit Discharge Time: 2:01 PM   Original-Treating Physician or Chiropractor    Page 2-Insurer/TPA    Page 3-Employer    Page 4-Employee

## 2019-08-16 NOTE — LETTER
EMPLOYEE’S CLAIM FOR COMPENSATION/ REPORT OF INITIAL TREATMENT  FORM C-4    EMPLOYEE’S CLAIM - PROVIDE ALL INFORMATION REQUESTED   First Name  Bryn Last Name  Brandon Birthdate                    1990                Sex  male Claim Number   Home Address  [address]  Age  28 y.o. 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 (if applicable)  installing ground mount    How did this injury or occupational disease occur? (Be specific an answer in detail. Use additional sheet if necessary)  tightening polls on framework   If you believe that you have an occupational disease, when did you first have knowledge of the disability and it relationship to your employment?  n/a Witnesses to the Accident  Qamarnew Jensenon      Nature of Injury or Occupational Disease  Laceration  Part(s) of Body Injured or Affected  Mouth, ,     I certify that the above is true and correct to the best of my knowledge and that I have provided this information in order to obtain the benefits of [place]  I hereby authorize any physician,  chiropractor, surgeon, practitioner, or other person, any hospital, including [facility] or [facility], any medical service organization, any insurance company, or other institution or organization to release to each other, any medical or other information, including benefits paid or payable, pertinent to this injury or disease, except information relative to diagnosis, treatment and/or counseling for AIDS, psychological conditions, alcohol or controlled substances, for which I must give specific authorization.  A Photostat of this authorization shall be as valid as the original.     Date  8/16/19   [facility]   Employee’s Signature   THIS REPORT MUST BE COMPLETED AND MAILED WITHIN 3 WORKING DAYS OF TREATMENT   [facility]  Name of Facility  [employer]   Date  8/16/2019 Diagnosis  (S01.81XA) Facial laceration, initial encounter Is there evidence the injured employee was under the influence of alcohol and/or another controlled substance at the time of accident?   Hour  12:18 PM Description of Injury or Disease  The encounter diagnosis was Facial laceration, initial encounter. No   Treatment  Assessment/Plan:  Discussed wound care at home.  Return to clinic in 1 week for suture removal, sooner for any signs of infection as discussed in clinic.  Return to work without restrictions.  1. Facial laceration, initial encounter  Tdap Vaccine =>8YO IM  Have you advised the patient to remain off work five days or more? No   X-Ray Findings      If Yes   From Date  To Date      From information given by the employee, together with medical evidence, can you directly connect this injury or occupational disease as job incurred?  Yes If No Full Duty  Yes Modified Duty      Is additional medical care by a physician indicated?  Yes If Modified Duty, Specify any Limitations / Restrictions      Do you know of any previous injury or disease contributing to this condition or occupational  "disease?                            No   Date  8/16/2019 Print Doctor’s Name Jay Fleming P.A.-C. I certify the employer’s copy of  this form was mailed on:   Address  [address] Insurer’s Use Only     [facility]  [address]    Provider’s Tax ID Number  [id number] Telephone  Dept: [phone]        e-JAY Connors P.A.-C.   e-Signature: Dr. Andrew Bach, Medical Director Degree  MD        ORIGINAL-TREATING PHYSICIAN OR CHIROPRACTOR    PAGE 2-INSURER/TPA    PAGE 3-EMPLOYER    PAGE 4-EMPLOYEE             Form C-4 (rev10/07)              BRIEF DESCRIPTION OF RIGHTS AND BENEFITS  (Pursuant to [place] 616C.050)    Notice of Injury or Occupational Disease (Incident Report Form C-1): If an injury or occupational disease (OD) arises out of and in the  course of employment, you must provide written notice to your employer as soon as practicable, but no later than 7 days after the accident or  OD. Your employer shall maintain a sufficient supply of the required forms.    Claim for Compensation (Form C-4): If medical treatment is sought, the form C-4 is available at the place of initial treatment. A completed  \"Claim for Compensation\" (Form C-4) must be filed within 90 days after an accident or OD. The treating physician or chiropractor must,  within 3 working days after treatment, complete and mail to the employer, the employer's insurer and third-party , the Claim for  Compensation.    Medical Treatment: If you require medical treatment for your on-the-job injury or OD, you may be required to select a physician or  chiropractor from a list provided by your workers’ compensation insurer, if it has contracted with an Organization for Managed Care (MCO) or  Preferred Provider Organization (PPO) or providers of health care. If your employer has not entered into a contract with an MCO or PPO, you  may select a physician or chiropractor from the Panel of Physicians and " Chiropractors. Any medical costs related to your industrial injury or  OD will be paid by your insurer.    Temporary Total Disability (TTD): If your doctor has certified that you are unable to work for a period of at least 5 consecutive days, or 5  cumulative days in a 20-day period, or places restrictions on you that your employer does not accommodate, you may be entitled to TTD  compensation.    Temporary Partial Disability (TPD): If the wage you receive upon reemployment is less than the compensation for TTD to which you are  entitled, the insurer may be required to pay you TPD compensation to make up the difference. TPD can only be paid for a maximum of 24  months.    Permanent Partial Disability (PPD): When your medical condition is stable and there is an indication of a PPD as a result of your injury or  OD, within 30 days, your insurer must arrange for an evaluation by a rating physician or chiropractor to determine the degree of your PPD. The  amount of your PPD award depends on the date of injury, the results of the PPD evaluation and your age and wage.    Permanent Total Disability (PTD): If you are medically certified by a treating physician or chiropractor as permanently and totally disabled  and have been granted a PTD status by your insurer, you are entitled to receive monthly benefits not to exceed 66 2/3% of your average  monthly wage. The amount of your PTD payments is subject to reduction if you previously received a PPD award.    Vocational Rehabilitation Services: You may be eligible for vocational rehabilitation services if you are unable to return to the job due to a  permanent physical impairment or permanent restrictions as a result of your injury or occupational disease.    Transportation and Per Paulette Reimbursement: You may be eligible for travel expenses and per paulette associated with medical treatment.    Reopening: You may be able to reopen your claim if your condition worsens after claim  closure.    Appeal Process: If you disagree with a written determination issued by the insurer or the insurer does not respond to your request, you may  appeal to the Department of Administration, , by following the instructions contained in your determination letter. You must  appeal the determination within 70 days from the date of the determination letter at [address], [address], [place], [place]  [address], or [address], [place], [place] [address]. If you disagree with the  decision, you may appeal to the  Department of Administration, . You must file your appeal within 30 days from the date of the  decision  letter at [address], [address], [place], [place] [address], or [address] [facility] [address] [facility] [address], [place], [place] [address]. If you  disagree with a decision of an , you may file a petition for judicial review with the District Court. You must do so within 30  days of the Appeal Officer’s decision. You may be represented by an  at your own expense or you may contact the [facility] for possible  representation.    [place]  for Injured Workers (NAIW): If you disagree with a  decision, you may request that NAIW represent you  without charge at an  Hearing. For information regarding denial of benefits, you may contact the [facility] at: [address], [place] [address], [phone], or [address], [place] [address], [phone]    To File a Complaint with the Division: If you wish to file a complaint with the  of the Division of Industrial Relations (DIR),  please contact the Workers’ Compensation Section, [address], [address], [place], [place] [address], telephone [phone], or  [address], [place] [address], telephone [phone].    For  assistance with Workers’ Compensation Issues: you may contact the Office of the Governor Consumer Health Assistance, [address] [place] [address], [facility] [address], [place] [address], Toll Free 1-663.435.4662, Web site: http://govcha.[facility].nv., E-mail  [email or web address].nv.                                                                                                                                                                                                                                   __________________________________________________________________                                                                   _____8/16/19____________                Employee Name / Signature                                                                                                                                                       Date                                                                                                                                                                                                     D-2 (rev. 10/07)

## 2019-08-16 NOTE — PROGRESS NOTES
"Chief Complaint   Patient presents with   • Laceration     WC new-laceration on chin        HISTORY OF PRESENT ILLNESS: Patient is a 28 y.o. male who presents today for the following:    DOI: 8/16/19 around 11:00 hrs  Patient was tightening down a bolt when the impact  \"popped me in the chin.\" No chipped/loose teeth. Denies tongue injury. Unknown last tdap.    Allergies:Other food and Sulfa drugs    PMH: No pertinent past medical history to this problem  MEDS: Medications were reviewed in Epic  ALLERGIES: Allergies were reviewed in Epic  SOCHX: Works as an  at [employer]  FH: No pertinent family history to this problem    Review of Systems:    Constitutional ROS: No unexpected change in weight, No weakness, No fatigue  Skin/Integumentary ROS: Chin laceration      Exam:  /76   Pulse (!) 55   Temp 36.5 °C (97.7 °F)   Resp 16   Wt 79.8 kg (176 lb)   SpO2 98%   General: Well developed, well nourished. No distress.  Pulmonary: Unlabored respiratory effort.    Neurologic: Grossly nonfocal. No facial asymmetry noted.  Skin: 1.5 cm laceration along the mandible on the left side.  Psych: Normal mood. Alert and oriented x3. Judgment and insight is normal.    LACERATION REPAIR PROCEDURE NOTE  The patient's identification was confirmed and consent was obtained.  This procedure was performed by Deepali Fleming PA-C at 1300 hrs.  Site: Mandible  Sterile procedures observed  Anesthetic used (type and amt): 2% lidocaine with epinephrine, 4 mL  Suture type/size: 5-0 Ethilon  Length: 1.5 cm  # of Sutures/staples: #3 interrupted    Complexity: Simple     Tetanus: Updated in clinic      Assessment/Plan:  Discussed wound care at home.  Return to clinic in 1 week for suture removal, sooner for any signs of infection as discussed in clinic.  Return to work without restrictions.  1. Facial laceration, initial encounter  Tdap Vaccine =>6YO IM       "

## 2019-08-16 NOTE — LETTER
"   [facility]  [facility] [facility] [place] [address]  Phone:  [phone] - Fax:  [phone]   [facility] Progress Report and Disability Certification  Date of Service: 8/16/2019   No Show:  No  Date / Time of Next Visit: 8/23/2019   Claim Information   Patient Name: Bryn Taylor  Claim Number:     Employer:   *** Date of Injury: 8/16/2019     Insurer / TPA: Misc Workers Comp *** ID / SSN:     Occupation: Install *** Diagnosis: The encounter diagnosis was Facial laceration, initial encounter.    Medical Information   Related to Industrial Injury? Yes ***   Subjective Complaints:  DOI: 8/16/19 around 11:00 hrs  Patient was tightening down a bolt when the impact  \"popped me in the chin.\" No chipped/loose teeth. Denies tongue injury. Unknown last tdap.   Objective Findings: Skin: 1.5 cm laceration along the mandible on the left side.   Pre-Existing Condition(s):     Assessment:   Initial Visit    Status: Additional Care Required  Permanent Disability:No    Plan:      Diagnostics:      Comments:       Disability Information   Status: Released to Full Duty    From:  8/16/2019  Through: 8/23/2019 Restrictions are:     Physical Restrictions   Sitting:    Standing:    Stooping:    Bending:      Squatting:    Walking:    Climbing:    Pushing:      Pulling:    Other:    Reaching Above Shoulder (L):   Reaching Above Shoulder (R):       Reaching Below Shoulder (L):    Reaching Below Shoulder (R):      Not to exceed Weight Limits   Carrying(hrs):   Weight Limit(lb):   Lifting(hrs):   Weight  Limit(lb):     Comments:      Repetitive Actions   Hands: i.e. Fine Manipulations from Grasping:     Feet: i.e. Operating Foot Controls:     Driving / Operate Machinery:     Physician Name: Deepali Fleming P.A.-C. Physician Signature: DEEPALI Hess P.A.-C. e-Signature: Dr. [place] Bach, Medical Director   Clinic Name / Location: [facility]" [place]  [address] [place] [address]  [place] [address] Clinic Phone Number: Dept: [phone]   Appointment Time: 11:55 Am Visit Start Time: 12:18 PM   Check-In Time:  12:10 Pm Visit Discharge Time: 1:19 Pm ***   Original-Treating Physician or Chiropractor    Page 2-Insurer/TPA    Page 3-Employer    Page 4-Employee

## 2019-08-23 ENCOUNTER — OCCUPATIONAL MEDICINE (OUTPATIENT)
Dept: URGENT CARE | Facility: PHYSICIAN GROUP | Age: 29
End: 2019-08-23
Payer: OTHER MISCELLANEOUS

## 2019-08-23 VITALS
OXYGEN SATURATION: 97 % | HEIGHT: 73 IN | HEART RATE: 86 BPM | DIASTOLIC BLOOD PRESSURE: 68 MMHG | SYSTOLIC BLOOD PRESSURE: 126 MMHG | BODY MASS INDEX: 23.33 KG/M2 | WEIGHT: 176 LBS | TEMPERATURE: 99 F | RESPIRATION RATE: 14 BRPM

## 2019-08-23 DIAGNOSIS — S01.81XA FACIAL LACERATION, INITIAL ENCOUNTER: ICD-10-CM

## 2019-08-23 DIAGNOSIS — Z48.02 VISIT FOR SUTURE REMOVAL: Primary | ICD-10-CM

## 2019-08-23 NOTE — PROGRESS NOTES
"Subjective:      Bryn Taylor is a 28 y.o. male who presents with Suture / Staple Removal (in chin placed last friday)    Pt PMH, SocHx, SurgHx, FamHx, Drug allergies and medications reviewed with pt/EPIC.      Family history reviewed, it is not pertinent to this complaint.     DOI: 8/16/2019.  PT here for suture removal of 3 stitches.  PT states no issues, no drainage or discharge from laceration.        Patient presents with:  Suture / Staple Removal: in chin placed last Friday.  Work comp follow up.           Review of Systems   All other systems reviewed and are negative.         Objective:     /68   Pulse 86   Temp 37.2 °C (99 °F)   Resp 14   Ht 1.854 m (6' 1\")   Wt 79.8 kg (176 lb)   SpO2 97%   BMI 23.22 kg/m²      Physical Exam   Neck:           Laceration to left side chin is well healed.  There no drainage discharge or redness.         Suture removal:  Skin is well healed without erythema, edema, purulent drainage, redness or warmth.    Skin cleansed prior to suture removal.   3 sutures removed from well healed laceration.    Antibacterial ointment applied to skin, wound bandaged.    Wound care instructions given to patient.      Assessment/Plan:     1. Visit for suture removal     2. Facial laceration, initial encounter       PT has reached MMI and has been discharged to return to work without restrictions.     "

## 2019-08-23 NOTE — LETTER
[facility] [address] [address]  Phone:  [phone] - Fax:  [phone]   [facility] Progress Report and Disability Certification  Date of Service: 8/23/2019   No Show:  No  Date / Time of Next Visit:     Claim Information   Patient Name: Bryn Taylor  Claim Number:     Employer:   [employer]  Date of Injury: 8/16/2019     Insurer / TPA: Misc Workers Comp  ID / SSN:     Occupation: Install  Diagnosis: The primary encounter diagnosis was Visit for suture removal. A diagnosis of Facial laceration, initial encounter was also pertinent to this visit.    Medical Information   Related to Industrial Injury?      Subjective Complaints:  DOI: 8/16/2019.  PT here for suture removal of 3 stitches.  PT states no issues, no drainage or discharge from laceration.      Objective Findings: Laceration to left side chin is well healed.  There no drainage discharge or redness.     Pre-Existing Condition(s):     Assessment:   Condition Improved    Status: Discharged /  MMI  Permanent Disability:No    Plan:      Diagnostics:      Comments:       Disability Information   Status: Released to Full Duty    From:     Through:   Restrictions are:     Physical Restrictions   Sitting:    Standing:    Stooping:    Bending:      Squatting:    Walking:    Climbing:    Pushing:      Pulling:    Other:    Reaching Above Shoulder (L):   Reaching Above Shoulder (R):       Reaching Below Shoulder (L):    Reaching Below Shoulder (R):      Not to exceed Weight Limits   Carrying(hrs):   Weight Limit(lb):   Lifting(hrs):   Weight  Limit(lb):     Comments:      Repetitive Actions   Hands: i.e. Fine Manipulations from Grasping:     Feet: i.e. Operating Foot Controls:     Driving / Operate Machinery:     Physician Name: Sidney Henson P.A.-C. Physician Signature: SIDNEY Kinsey P.A.-C. e-Signature: Dr. [place] Bach, Medical Director   Clinic Name / Location: [facility] [address] [address] Clinic Phone Number: Dept: [phone]   Appointment Time: 9:15 Am Visit Start Time: 9:17 AM   Check-In Time:  9:12 Am Visit Discharge Time:  9:41AM   Original-Treating Physician or Chiropractor    Page 2-Insurer/TPA    Page 3-Employer    Page 4-Employee

## 2021-03-19 ENCOUNTER — APPOINTMENT (RX ONLY)
Dept: URBAN - METROPOLITAN AREA CLINIC 4 | Facility: CLINIC | Age: 31
Setting detail: DERMATOLOGY
End: 2021-03-19

## 2021-03-19 DIAGNOSIS — D22 MELANOCYTIC NEVI: ICD-10-CM

## 2021-03-19 DIAGNOSIS — D18.0 HEMANGIOMA: ICD-10-CM

## 2021-03-19 DIAGNOSIS — L81.5 LEUKODERMA, NOT ELSEWHERE CLASSIFIED: ICD-10-CM

## 2021-03-19 DIAGNOSIS — L73.8 OTHER SPECIFIED FOLLICULAR DISORDERS: ICD-10-CM

## 2021-03-19 DIAGNOSIS — L81.4 OTHER MELANIN HYPERPIGMENTATION: ICD-10-CM

## 2021-03-19 DIAGNOSIS — Z71.89 OTHER SPECIFIED COUNSELING: ICD-10-CM

## 2021-03-19 PROBLEM — D18.01 HEMANGIOMA OF SKIN AND SUBCUTANEOUS TISSUE: Status: ACTIVE | Noted: 2021-03-19

## 2021-03-19 PROBLEM — D22.72 MELANOCYTIC NEVI OF LEFT LOWER LIMB, INCLUDING HIP: Status: ACTIVE | Noted: 2021-03-19

## 2021-03-19 PROBLEM — D22.61 MELANOCYTIC NEVI OF RIGHT UPPER LIMB, INCLUDING SHOULDER: Status: ACTIVE | Noted: 2021-03-19

## 2021-03-19 PROBLEM — D22.71 MELANOCYTIC NEVI OF RIGHT LOWER LIMB, INCLUDING HIP: Status: ACTIVE | Noted: 2021-03-19

## 2021-03-19 PROBLEM — D22.62 MELANOCYTIC NEVI OF LEFT UPPER LIMB, INCLUDING SHOULDER: Status: ACTIVE | Noted: 2021-03-19

## 2021-03-19 PROBLEM — D22.39 MELANOCYTIC NEVI OF OTHER PARTS OF FACE: Status: ACTIVE | Noted: 2021-03-19

## 2021-03-19 PROBLEM — D22.5 MELANOCYTIC NEVI OF TRUNK: Status: ACTIVE | Noted: 2021-03-19

## 2021-03-19 PROCEDURE — ? COUNSELING

## 2021-03-19 PROCEDURE — 99203 OFFICE O/P NEW LOW 30 MIN: CPT

## 2021-03-19 ASSESSMENT — LOCATION SIMPLE DESCRIPTION DERM
LOCATION SIMPLE: RIGHT FOREARM
LOCATION SIMPLE: LEFT FOREARM
LOCATION SIMPLE: RIGHT POSTERIOR THIGH
LOCATION SIMPLE: RIGHT UPPER ARM
LOCATION SIMPLE: ABDOMEN
LOCATION SIMPLE: RIGHT CHEEK
LOCATION SIMPLE: LOWER BACK
LOCATION SIMPLE: LEFT POSTERIOR THIGH
LOCATION SIMPLE: RIGHT UPPER BACK
LOCATION SIMPLE: CHEST
LOCATION SIMPLE: LEFT CHEEK
LOCATION SIMPLE: LEFT UPPER ARM
LOCATION SIMPLE: LEFT UPPER BACK

## 2021-03-19 ASSESSMENT — LOCATION DETAILED DESCRIPTION DERM
LOCATION DETAILED: RIGHT DISTAL DORSAL FOREARM
LOCATION DETAILED: RIGHT PROXIMAL RADIAL DORSAL FOREARM
LOCATION DETAILED: PERIUMBILICAL SKIN
LOCATION DETAILED: LEFT PROXIMAL POSTERIOR UPPER ARM
LOCATION DETAILED: LEFT MEDIAL SUPERIOR CHEST
LOCATION DETAILED: LEFT DISTAL DORSAL FOREARM
LOCATION DETAILED: RIGHT INFERIOR CENTRAL MALAR CHEEK
LOCATION DETAILED: RIGHT SUPERIOR MEDIAL UPPER BACK
LOCATION DETAILED: RIGHT DISTAL POSTERIOR THIGH
LOCATION DETAILED: SUPERIOR LUMBAR SPINE
LOCATION DETAILED: LEFT CENTRAL MALAR CHEEK
LOCATION DETAILED: LEFT SUPERIOR MEDIAL UPPER BACK
LOCATION DETAILED: RIGHT DISTAL POSTERIOR UPPER ARM
LOCATION DETAILED: RIGHT PROXIMAL POSTERIOR UPPER ARM
LOCATION DETAILED: LEFT DISTAL POSTERIOR THIGH
LOCATION DETAILED: LEFT DISTAL POSTERIOR UPPER ARM
LOCATION DETAILED: RIGHT MID-UPPER BACK
LOCATION DETAILED: LEFT PROXIMAL DORSAL FOREARM

## 2021-03-19 ASSESSMENT — LOCATION ZONE DERM
LOCATION ZONE: FACE
LOCATION ZONE: TRUNK
LOCATION ZONE: ARM
LOCATION ZONE: LEG